# Patient Record
Sex: FEMALE | URBAN - METROPOLITAN AREA
[De-identification: names, ages, dates, MRNs, and addresses within clinical notes are randomized per-mention and may not be internally consistent; named-entity substitution may affect disease eponyms.]

---
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
## 2024-03-20 NOTE — PROCEDURE: TREATMENT REGIMEN
Patient returning call to [place]. Patient states she is feeling good right now. Please return call to patient.  
Initiate Treatment: Tretinoin qhs\\nBenzaclin daily
Detail Level: Zone
Discontinue Regimen: Claravis